# Patient Record
Sex: FEMALE | Race: WHITE | NOT HISPANIC OR LATINO | Employment: FULL TIME | URBAN - METROPOLITAN AREA
[De-identification: names, ages, dates, MRNs, and addresses within clinical notes are randomized per-mention and may not be internally consistent; named-entity substitution may affect disease eponyms.]

---

## 2023-04-21 ENCOUNTER — HOSPITAL ENCOUNTER (OUTPATIENT)
Dept: DATA CONVERSION | Facility: HOSPITAL | Age: 48
End: 2023-04-21
Attending: OBSTETRICS & GYNECOLOGY | Admitting: OBSTETRICS & GYNECOLOGY
Payer: COMMERCIAL

## 2023-04-21 DIAGNOSIS — R10.2 PELVIC AND PERINEAL PAIN: ICD-10-CM

## 2023-04-21 DIAGNOSIS — F41.9 ANXIETY DISORDER, UNSPECIFIED: ICD-10-CM

## 2023-04-21 DIAGNOSIS — N84.0 POLYP OF CORPUS UTERI: ICD-10-CM

## 2023-04-21 DIAGNOSIS — E03.9 HYPOTHYROIDISM, UNSPECIFIED: ICD-10-CM

## 2023-04-21 DIAGNOSIS — E78.5 HYPERLIPIDEMIA, UNSPECIFIED: ICD-10-CM

## 2023-04-21 DIAGNOSIS — G40.909 EPILEPSY, UNSPECIFIED, NOT INTRACTABLE, WITHOUT STATUS EPILEPTICUS (MULTI): ICD-10-CM

## 2023-04-21 DIAGNOSIS — N92.0 EXCESSIVE AND FREQUENT MENSTRUATION WITH REGULAR CYCLE: ICD-10-CM

## 2023-04-21 DIAGNOSIS — F32.A DEPRESSION, UNSPECIFIED: ICD-10-CM

## 2023-04-21 LAB
ERYTHROCYTE DISTRIBUTION WIDTH (RATIO) BY AUTOMATED COUNT: 13.4 % (ref 11.5–14.5)
ERYTHROCYTE MEAN CORPUSCULAR HEMOGLOBIN CONCENTRATION (G/DL) BY AUTOMATED: 33.8 G/DL (ref 32–36)
ERYTHROCYTE MEAN CORPUSCULAR VOLUME (FL) BY AUTOMATED COUNT: 92 FL (ref 80–100)
ERYTHROCYTES (10*6/UL) IN BLOOD BY AUTOMATED COUNT: 4.07 X10E12/L (ref 4–5.2)
HEMATOCRIT (%) IN BLOOD BY AUTOMATED COUNT: 37.3 % (ref 36–46)
HEMOGLOBIN (G/DL) IN BLOOD: 12.6 G/DL (ref 12–16)
LEUKOCYTES (10*3/UL) IN BLOOD BY AUTOMATED COUNT: 3.6 X10E9/L (ref 4.4–11.3)
PLATELETS (10*3/UL) IN BLOOD AUTOMATED COUNT: 123 X10E9/L (ref 150–450)

## 2023-04-28 LAB
COMPLETE PATHOLOGY REPORT: NORMAL
CONVERTED CLINICAL DIAGNOSIS-HISTORY: NORMAL
CONVERTED FINAL DIAGNOSIS: NORMAL
CONVERTED FINAL REPORT PDF LINK TO COPY AND PASTE: NORMAL
CONVERTED GROSS DESCRIPTION: NORMAL

## 2023-07-19 ENCOUNTER — OFFICE VISIT (OUTPATIENT)
Dept: PRIMARY CARE | Facility: CLINIC | Age: 48
End: 2023-07-19
Payer: COMMERCIAL

## 2023-07-19 ENCOUNTER — TELEPHONE (OUTPATIENT)
Dept: PRIMARY CARE | Facility: CLINIC | Age: 48
End: 2023-07-19

## 2023-07-19 VITALS
HEIGHT: 63 IN | SYSTOLIC BLOOD PRESSURE: 124 MMHG | DIASTOLIC BLOOD PRESSURE: 78 MMHG | HEART RATE: 105 BPM | WEIGHT: 183 LBS | BODY MASS INDEX: 32.43 KG/M2

## 2023-07-19 DIAGNOSIS — Z12.31 ENCOUNTER FOR SCREENING MAMMOGRAM FOR MALIGNANT NEOPLASM OF BREAST: ICD-10-CM

## 2023-07-19 DIAGNOSIS — R63.5 WEIGHT GAIN: ICD-10-CM

## 2023-07-19 DIAGNOSIS — E66.9 CLASS 1 OBESITY WITHOUT SERIOUS COMORBIDITY WITH BODY MASS INDEX (BMI) OF 32.0 TO 32.9 IN ADULT, UNSPECIFIED OBESITY TYPE: ICD-10-CM

## 2023-07-19 DIAGNOSIS — H60.8X2 CHRONIC ECZEMATOUS OTITIS EXTERNA OF LEFT EAR: ICD-10-CM

## 2023-07-19 DIAGNOSIS — Z00.00 ENCOUNTER FOR PREVENTIVE HEALTH EXAMINATION: Primary | ICD-10-CM

## 2023-07-19 PROBLEM — E78.5 HYPERLIPIDEMIA: Status: ACTIVE | Noted: 2023-07-19

## 2023-07-19 PROBLEM — E03.9 HYPOTHYROIDISM, ACQUIRED: Status: ACTIVE | Noted: 2018-05-21

## 2023-07-19 PROBLEM — H52.203 ASTIGMATISM, BILATERAL: Status: ACTIVE | Noted: 2018-05-21

## 2023-07-19 PROBLEM — H52.13 MYOPIA OF BOTH EYES: Status: ACTIVE | Noted: 2023-07-19

## 2023-07-19 PROBLEM — R79.89 ABNORMAL LIVER FUNCTION TEST: Status: ACTIVE | Noted: 2023-07-19

## 2023-07-19 PROCEDURE — 1036F TOBACCO NON-USER: CPT | Performed by: STUDENT IN AN ORGANIZED HEALTH CARE EDUCATION/TRAINING PROGRAM

## 2023-07-19 PROCEDURE — 99396 PREV VISIT EST AGE 40-64: CPT | Performed by: STUDENT IN AN ORGANIZED HEALTH CARE EDUCATION/TRAINING PROGRAM

## 2023-07-19 PROCEDURE — 3008F BODY MASS INDEX DOCD: CPT | Performed by: STUDENT IN AN ORGANIZED HEALTH CARE EDUCATION/TRAINING PROGRAM

## 2023-07-19 PROCEDURE — 99214 OFFICE O/P EST MOD 30 MIN: CPT | Performed by: STUDENT IN AN ORGANIZED HEALTH CARE EDUCATION/TRAINING PROGRAM

## 2023-07-19 RX ORDER — BUSPIRONE HYDROCHLORIDE 10 MG/1
10 TABLET ORAL 3 TIMES DAILY
COMMUNITY
Start: 2023-07-10

## 2023-07-19 RX ORDER — SEMAGLUTIDE 0.25 MG/.5ML
0.25 INJECTION, SOLUTION SUBCUTANEOUS
Qty: 2 ML | Refills: 0 | Status: SHIPPED | OUTPATIENT
Start: 2023-07-19 | End: 2024-02-05

## 2023-07-19 RX ORDER — HYDROXYZINE PAMOATE 50 MG/1
50 CAPSULE ORAL ONCE AS NEEDED
COMMUNITY
Start: 2021-10-11

## 2023-07-19 RX ORDER — ACETIC ACID 20.65 MG/ML
5 SOLUTION AURICULAR (OTIC) 3 TIMES DAILY
Qty: 15 ML | Refills: 0 | Status: SHIPPED | OUTPATIENT
Start: 2023-07-19 | End: 2023-07-26

## 2023-07-19 ASSESSMENT — PATIENT HEALTH QUESTIONNAIRE - PHQ9
SUM OF ALL RESPONSES TO PHQ9 QUESTIONS 1 AND 2: 0
2. FEELING DOWN, DEPRESSED OR HOPELESS: NOT AT ALL
1. LITTLE INTEREST OR PLEASURE IN DOING THINGS: NOT AT ALL

## 2023-07-19 NOTE — PROGRESS NOTES
"Subjective   Patient ID: Steffanie Santoro is a 48 y.o. female who presents for Earache.  HPI  She is a 48 years old female who presented today with ear problem.  Left ear feels itchy, muffle, no discharge no ear pain or fever.  She denies hearing loss.    She also is complaining about weight gain over the past year.  She is not feeling okay with that started crying in the office.  She did try diet changing(eating healthy), she is not exercising as much as she wants to.  She is concerned because the joints are started hurting especially the hip and the knees.    Denies new onset headaches, chills, n/v/d, chest pain, SOB, abdominal pain, urinary symptoms, and lower extremity edema.     Review of Systems  All other systems have been reviewed and are negative.    Visit Vitals  /78   Pulse 105   Ht 1.59 m (5' 2.6\")   Wt 83 kg (183 lb)   BMI 32.83 kg/m²   Smoking Status Never   BSA 1.91 m²       Objective   Physical Exam  General: Alert and oriented. Appears well-nourished and in no acute distress.  Eyes: PERRLA. EOMI.  Head/neck: Normocephalic. Supple.  Ear: No erythema on the external canal, some scale present, no pain during the exam.  Tympanic membrane not fully visualized due to wax, but normal color  Lymphatics: No cervical lymphadenopathy.  Respiratory/Thorax: Clear to auscultation bilaterally. No wheezing.   Cardiovascular: Regular rate and rhythm. No murmurs.  Gastrointestinal: Soft, nontender, nondistended. +BS   Musculoskeletal: ROM intact. No joint swelling. Normal strength   Extremities: Warm and well perfused. No peripheral edema.  Neurological: No gross neurologic deficits.   Psychological: Appropriate mood and affect.   Skin: No visible rashes or lesions.     Assessment/Plan   She is a 48 years old female who presented today to the office for physical exam.    #Ear eczema  -External canal was positive for some scale ,TM gray color  -Acetic acid 2%, apply to the left ear 3 times daily.  If that is is not " helpful we will try the same medication with corticosteroid topical.    #Weight gain  -Her labs were done in December, TSH was normal  -No A1c was done at the time but blood glucose was normal  -Patient is advised to follow a strict healthy diet, exercise more  -If insurance will cover we can start wegovy  0.25 mg subcutaneous for 4 weeks (watch for side effect) and increase dose after that to 0.5 mg    #Health maintenance  -Screening for colon cancer (she wanted to to order by her GI doctor)  -Ordered mammogram, screening for breast cancer  -She is up-to-date with Pap smear  -not due for labs      No red flags. Follow up in  1 month    Problem List Items Addressed This Visit    None  Visit Diagnoses       Encounter for preventive health examination    -  Primary    Encounter for screening mammogram for malignant neoplasm of breast        Relevant Orders    BI mammo bilateral screening tomosynthesis    Chronic eczematous otitis externa of left ear        Relevant Medications    acetic acid (Vosol) 2 % otic solution    Weight gain        Class 1 obesity without serious comorbidity with body mass index (BMI) of 32.0 to 32.9 in adult, unspecified obesity type        Relevant Medications    semaglutide, weight loss, (Wegovy) 0.25 mg/0.5 mL pen injector            I have personally reviewed all available pertinent labs, imaging, and consult notes with the patient.     All questions and concerns were addressed. Patient verbalizes understanding instructions and agrees with established plan of care.     I discussed the plan with Dr.Shahrimmnyan Juanita Carballo MD  Family medicine resident  PGY2

## 2023-07-19 NOTE — TELEPHONE ENCOUNTER
Great. I sent the Wegovy. She will need a new prescription for the 0.5mg weekly dose, so she needs to contact us when she is ready for that.

## 2023-07-20 NOTE — TELEPHONE ENCOUNTER
I would just recommend hydrocortisone over the counter then to the scaly part of the external ear canal. She can try a a drop of peroxide or alcohol to the ear to get it to dry up. It should dry out over the next 24 hours.

## 2023-07-20 NOTE — TELEPHONE ENCOUNTER
Wegovy on back order for a few weeks.   Also patient states that she did use the drops we prescribed last night and her ear has seemed to have gotten worse. She states it is now clogged and she cannot hear and it will not drain. She stopped using this.

## 2023-08-22 LAB
CHLAMYDIA TRACH., AMPLIFIED: NEGATIVE
CLUE CELLS: NORMAL
HSV 1 PCR QUAL, SKIN/MUCOSA: NOT DETECTED
HSV 2 PCR QUAL, SKIN/MUCOSA: NOT DETECTED
N. GONORRHEA, AMPLIFIED: NEGATIVE
NUGENT SCORE: 0
YEAST: NORMAL

## 2023-09-07 VITALS — BODY MASS INDEX: 34.08 KG/M2 | HEIGHT: 62 IN | WEIGHT: 185.19 LBS

## 2023-09-14 NOTE — H&P
History & Physical Reviewed:   Pregnant/Lactating:  ·  Are You Pregnant no   ·  Are You Currently Breastfeeding no     I have reviewed the History and Physical dated:  22-Mar-2023   History and Physical reviewed and relevant findings noted. Patient examined to review pertinent physical  findings.: No significant changes   Home Medications Reviewed: no changes noted   Allergies Reviewed: no changes noted       ERAS (Enhanced Recovery After Surgery):  ·  ERAS Patient: no     Consent:   COVID-19 Consent:  ·  COVID-19 Risk Consent Surgeon has reviewed key risks related to the risk of garrick COVID-19 and if they contract COVID-19 what the risks are.       Electronic Signatures:  Temi Henderson)  (Signed 21-Apr-2023 11:45)   Authored: History & Physical Reviewed, ERAS, Consent,  Note Completion      Last Updated: 21-Apr-2023 11:45 by Temi Henderson)

## 2023-10-02 NOTE — OP NOTE
PROCEDURE DETAILS    Preoperative Diagnosis:  menorrhagia    Postoperative Diagnosis:  menorrhagia    Surgeon: Temi Henderson  Resident/Fellow/Other Assistant: None of these were associated with this case    Procedure:  1.  Hysteroscopy dilation and curettage, ANABELLE ablation    Anesthesia: No anesthesiologist associated with this case  Estimated Blood Loss: 2  Findings: Normal appearing endometrial cavity.  Specimens(s) Collected: yes,  endometrial curettings.    Complications: none  Urine Output: 30        Operative Report:   The patient was brought to the operating room where compression stockings were applied and adequate anesthesia was obtained. She was placed in lithotomy position  and iodine vaginal and perineal prep was performed. A weighted speculum was placed in the vagina. The anterior lip of the cervix was grasped with a tenaculum.  The cervix was progressively dilated using cervical dilators and cervical length wa measured.  The uterus was sounded to 8 cm. The hysteroscope was then inserted and intrauterine placement confirmed. The cervical canal, endometrial cavity and both tubal ossea were visualized. Normal appearing endometrium was noted.   The hysteroscope was then removed. Sharp curettage was then performed with endometrial tissue obtained and sent to pathology.   The Anabelle device was then inserted and opened to the previously measure endometrial cavity length of 5 cm. The width of the device was within guidelines. Cavity assessment was passed. Endometrial ablation was then performed for 2 minutes duration.   The hysteroscope was reinserted and ablated endometrial cavity confirmed. The hysteroscope was removed.  Tenaculum was removed from the cervix. All instruments were removed from the vagina. The patient was taken in stable condition to recovery.                        Attestation:   Note Completion:  Attending Attestation I performed the procedure without a resident          Electronic Signatures:  Temi Henderson)  (Signed 21-Apr-2023 12:49)   Authored: Post-Operative Note, Chart Review, Note Completion      Last Updated: 21-Apr-2023 12:49 by Temi Henderson)

## 2023-12-05 ENCOUNTER — OFFICE VISIT (OUTPATIENT)
Dept: PRIMARY CARE | Facility: CLINIC | Age: 48
End: 2023-12-05
Payer: COMMERCIAL

## 2023-12-05 VITALS
DIASTOLIC BLOOD PRESSURE: 80 MMHG | BODY MASS INDEX: 33.49 KG/M2 | HEIGHT: 63 IN | SYSTOLIC BLOOD PRESSURE: 140 MMHG | HEART RATE: 100 BPM | WEIGHT: 189 LBS

## 2023-12-05 DIAGNOSIS — R51.9 CHRONIC NONINTRACTABLE HEADACHE, UNSPECIFIED HEADACHE TYPE: Primary | ICD-10-CM

## 2023-12-05 DIAGNOSIS — G89.29 CHRONIC NONINTRACTABLE HEADACHE, UNSPECIFIED HEADACHE TYPE: Primary | ICD-10-CM

## 2023-12-05 DIAGNOSIS — H53.8 BLURRY VISION, LEFT EYE: ICD-10-CM

## 2023-12-05 PROCEDURE — 3008F BODY MASS INDEX DOCD: CPT | Performed by: STUDENT IN AN ORGANIZED HEALTH CARE EDUCATION/TRAINING PROGRAM

## 2023-12-05 PROCEDURE — 1036F TOBACCO NON-USER: CPT | Performed by: STUDENT IN AN ORGANIZED HEALTH CARE EDUCATION/TRAINING PROGRAM

## 2023-12-05 PROCEDURE — 99214 OFFICE O/P EST MOD 30 MIN: CPT | Performed by: STUDENT IN AN ORGANIZED HEALTH CARE EDUCATION/TRAINING PROGRAM

## 2023-12-05 ASSESSMENT — PATIENT HEALTH QUESTIONNAIRE - PHQ9
2. FEELING DOWN, DEPRESSED OR HOPELESS: NOT AT ALL
SUM OF ALL RESPONSES TO PHQ9 QUESTIONS 1 AND 2: 0
1. LITTLE INTEREST OR PLEASURE IN DOING THINGS: NOT AT ALL

## 2023-12-05 NOTE — PROGRESS NOTES
Subjective   Patient ID: Steffanie Santoro is a 48 y.o. female who presents for Headache.         Reviewed all medications by prescribing practitioner or clinical pharmacist (such as prescriptions, OTCs, herbal therapies and supplements) and documented in the medical record.    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    All other systems have been reviewed and are negative and noncontributory to this patient's current ailments.    Past Medical History:   Diagnosis Date    Personal history of other mental and behavioral disorders     History of anxiety     Past Surgical History:   Procedure Laterality Date    FOOT SURGERY  01/22/2015    Foot Surgery    OTHER SURGICAL HISTORY  01/22/2015    Gastrointestinal Surgery    OTHER SURGICAL HISTORY  01/22/2015    Uterine Fibroid Embolization    OTHER SURGICAL HISTORY  01/22/2015    Ovarian Cystectomy     Social History     Tobacco Use    Smoking status: Never    Smokeless tobacco: Never  "    No family history on file.    There is no immunization history on file for this patient.  Current Outpatient Medications   Medication Instructions    busPIRone (BUSPAR) 10 mg, 3 times daily    hydrOXYzine pamoate (VISTARIL) 50 mg, oral, Once as needed    Wegovy 0.25 mg, subcutaneous, Weekly     Allergies   Allergen Reactions    Tetracyclines Swelling and Other     Reaction Date:Adolescence       Objective   Vitals:    12/05/23 1543   BP: 140/80   Pulse: 100   Weight: 85.7 kg (189 lb)   Height: 1.6 m (5' 3\")     Body mass index is 33.48 kg/m².    BP Readings from Last 3 Encounters:   12/05/23 140/80   07/19/23 124/78   05/09/23 120/80      Wt Readings from Last 3 Encounters:   12/05/23 85.7 kg (189 lb)   07/19/23 83 kg (183 lb)   05/09/23 84.4 kg (186 lb)        No visits with results within 1 Month(s) from this visit.   Latest known visit with results is:   Orders Only on 08/22/2023   Component Date Value    Neisseria gonorrhea,Ampl* 08/21/2023 NEGATIVE     Chlamydia trachomatis, A* 08/21/2023 NEGATIVE     Tahir Score 08/21/2023 0     Yeast 08/21/2023 ABSENT     Clue Cells 08/21/2023 ABSENT     Herpes simplex virus 1 P* 08/21/2023 NOT DETECTED     Herpes simplex virus 2 P* 08/21/2023 NOT DETECTED      Physical Exam  CONSTITUTIONAL - well nourished, well developed, looks like stated age, in no acute distress, not ill-appearing, and not tired appearing  SKIN - normal skin color and pigmentation, normal skin turgor without rash, lesions, or nodules visualized  HEAD - no trauma, normocephalic  CHEST - clear to auscultation, no wheezing, no crackles and no rales, good effort  CARDIAC - regular rate and regular rhythm, no skipped beats, no murmur  EXTREMITIES - no edema, no deformities  NEUROLOGICAL - normal gait, normal balance, normal motor  PSYCHIATRIC - alert, pleasant and cordial, age-appropriate     Assessment/Plan   Problem List Items Addressed This Visit       Chronic nonintractable headache - Primary     As " we discussed, we will order a brain MRI to make sure there are no masses that may be responsible for your symptoms  We will follow up with results  You can try stopping the mushroom coffee to see if your symptoms resolve         Relevant Orders    MR brain wo IV contrast    Blurry vision, left eye    Relevant Orders    MR brain wo IV contrast

## 2023-12-05 NOTE — ASSESSMENT & PLAN NOTE
As we discussed, we will order a brain MRI to make sure there are no masses that may be responsible for your symptoms  We will follow up with results  You can try stopping the mushroom coffee to see if your symptoms resolve

## 2023-12-12 ENCOUNTER — PATIENT MESSAGE (OUTPATIENT)
Dept: PRIMARY CARE | Facility: CLINIC | Age: 48
End: 2023-12-12
Payer: COMMERCIAL

## 2023-12-12 DIAGNOSIS — E66.9 CLASS 1 OBESITY WITHOUT SERIOUS COMORBIDITY WITH BODY MASS INDEX (BMI) OF 32.0 TO 32.9 IN ADULT, UNSPECIFIED OBESITY TYPE: Primary | ICD-10-CM

## 2024-02-03 DIAGNOSIS — E66.9 CLASS 1 OBESITY WITHOUT SERIOUS COMORBIDITY WITH BODY MASS INDEX (BMI) OF 32.0 TO 32.9 IN ADULT, UNSPECIFIED OBESITY TYPE: ICD-10-CM

## 2024-02-05 DIAGNOSIS — E66.9 CLASS 1 OBESITY WITHOUT SERIOUS COMORBIDITY WITH BODY MASS INDEX (BMI) OF 32.0 TO 32.9 IN ADULT, UNSPECIFIED OBESITY TYPE: ICD-10-CM

## 2024-02-05 RX ORDER — TIRZEPATIDE 2.5 MG/.5ML
2.5 INJECTION, SOLUTION SUBCUTANEOUS
Qty: 2 ML | Refills: 0 | Status: SHIPPED | OUTPATIENT
Start: 2024-02-05

## 2024-02-05 RX ORDER — TIRZEPATIDE 2.5 MG/.5ML
2.5 INJECTION, SOLUTION SUBCUTANEOUS
Qty: 2 ML | Refills: 0 | Status: SHIPPED | OUTPATIENT
Start: 2024-02-05 | End: 2024-02-05 | Stop reason: SDUPTHER

## 2024-04-29 ENCOUNTER — TELEPHONE (OUTPATIENT)
Dept: PRIMARY CARE | Facility: CLINIC | Age: 49
End: 2024-04-29
Payer: COMMERCIAL

## 2024-04-29 DIAGNOSIS — R11.0 NAUSEA: Primary | ICD-10-CM

## 2024-04-29 RX ORDER — ONDANSETRON 4 MG/1
4 TABLET, ORALLY DISINTEGRATING ORAL EVERY 8 HOURS PRN
Qty: 30 TABLET | Refills: 0 | Status: SHIPPED | OUTPATIENT
Start: 2024-04-29

## 2024-05-13 ENCOUNTER — TELEPHONE (OUTPATIENT)
Dept: OTOLARYNGOLOGY | Facility: CLINIC | Age: 49
End: 2024-05-13

## 2024-05-13 ENCOUNTER — OFFICE VISIT (OUTPATIENT)
Dept: OTOLARYNGOLOGY | Facility: CLINIC | Age: 49
End: 2024-05-13
Payer: COMMERCIAL

## 2024-05-13 VITALS — BODY MASS INDEX: 33.19 KG/M2 | WEIGHT: 187.3 LBS | HEIGHT: 63 IN | TEMPERATURE: 97.5 F

## 2024-05-13 DIAGNOSIS — R09.81 NASAL CONGESTION: ICD-10-CM

## 2024-05-13 DIAGNOSIS — J34.89 NASAL AND SINUS DISCHARGE: ICD-10-CM

## 2024-05-13 DIAGNOSIS — J30.9 ALLERGIC RHINITIS, UNSPECIFIED SEASONALITY, UNSPECIFIED TRIGGER: ICD-10-CM

## 2024-05-13 DIAGNOSIS — R44.8 FACIAL PRESSURE: ICD-10-CM

## 2024-05-13 DIAGNOSIS — J34.2 DEVIATED NASAL SEPTUM: Primary | ICD-10-CM

## 2024-05-13 PROCEDURE — 99204 OFFICE O/P NEW MOD 45 MIN: CPT | Performed by: OTOLARYNGOLOGY

## 2024-05-13 PROCEDURE — 31231 NASAL ENDOSCOPY DX: CPT | Performed by: OTOLARYNGOLOGY

## 2024-05-13 PROCEDURE — 1036F TOBACCO NON-USER: CPT | Performed by: OTOLARYNGOLOGY

## 2024-05-13 PROCEDURE — 3008F BODY MASS INDEX DOCD: CPT | Performed by: OTOLARYNGOLOGY

## 2024-05-13 RX ORDER — AZELASTINE 1 MG/ML
2 SPRAY, METERED NASAL 2 TIMES DAILY
Qty: 30 ML | Refills: 3 | Status: SHIPPED | OUTPATIENT
Start: 2024-05-13 | End: 2024-06-07

## 2024-05-13 RX ORDER — FLUTICASONE PROPIONATE 93 UG/1
SPRAY, METERED NASAL
Qty: 16 ML | Refills: 11 | Status: SHIPPED | OUTPATIENT
Start: 2024-05-13

## 2024-05-13 NOTE — PROGRESS NOTES
Chief Complaint:  Nasal congestion    History Of Present Illness:  Reason For Visit:     April presents as a new patient to me.  She has had sinonasal symptoms for a number of years that seem to be worsening.  Her most pressing concern was left worse than right nasal airway obstruction.  She also mentioned feeling cracking and popping sensation in her left cheek over the last several weeks particularly when she lays down at night.  This does not occur nightly but certainly several times per week.    She has had sinonasal symptoms in the distant past and was told that she had some kind of lesion within the left maxillary sinus.    She is currently taking fluticasone 1 spray each nostril once per day as prescribed through her primary care provider.  She has been using this for at least the last 6 months.    Main Symptoms:  Patient does not have anterior nasal drainage.     Patient does not have  posterior nasal drainage.    Patient has L>R nasal airway obstruction.   Patient does not have facial pain.  Patient has  left-sided facial pressure.   Patient does not have decreased sense of smell.    Associated Symptoms:   Patient has  headaches.    Patient does not have throat clearing.    Patient does not have coughing.    Patient does not have dysphonia.   Patient does not have nasal bleeding.     Medications currently on for sinonasal symptoms: Flonase 1 puff each side at night (used consistently for 6 months)   Medications tried in the past for sinonasal symptoms:  Sudafed    Other Pertinent Medical Conditions:   Patient does not have asthma.    Patient does not have aspirin sensitivity.    Patient has migraines. Patient does not visit a neurologist.   Patient has history of allergy testing (more than 1x). When: 6-7 years ago (+cats, dogs, dust mites, and cashew) Patient does not have history of IT.   Patient does not have history of sinus surgery.    Patient does not have history of nasal fracture.    Patient does not  have heartburn.    The patient does not take medical therapy for heartburn.   The patient has imaging of sinuses. When: MRI 2011, and she has a more recent MRI brain wo IV contrast 12/15/23 for headaches.     Active Problems:  Patient Active Problem List   Diagnosis    Abnormal liver function test    Astigmatism, bilateral    Generalized anxiety disorder with panic attacks    Hyperlipidemia    Hypothyroidism, acquired    IBS (irritable bowel syndrome)    Myopia of both eyes    PMDD (premenstrual dysphoric disorder)    Chronic nonintractable headache    Blurry vision, left eye      Past Medical History:  She has a past medical history of Personal history of other mental and behavioral disorders.    Surgical History:  She has a past surgical history that includes Foot surgery (01/22/2015); Other surgical history (01/22/2015); Other surgical history (01/22/2015); and Other surgical history (01/22/2015).     Family History:  No family history on file.    Social History:  She reports that she has never smoked. She has never used smokeless tobacco. No history on file for alcohol use and drug use.     Allergies:  Tetracyclines    Current Meds:    Current Outpatient Medications:     busPIRone (Buspar) 10 mg tablet, 1 tablet (10 mg) 3 times a day., Disp: , Rfl:     hydrOXYzine pamoate (Vistaril) 50 mg capsule, Take 1 capsule (50 mg) by mouth 1 time if needed., Disp: , Rfl:     ondansetron ODT (Zofran-ODT) 4 mg disintegrating tablet, Take 1 tablet (4 mg) by mouth every 8 hours if needed for nausea or vomiting., Disp: 30 tablet, Rfl: 0    tirzepatide, weight loss, (Zepbound) 2.5 mg/0.5 mL injection, Inject 2.5 mg under the skin 1 (one) time per week., Disp: 2 mL, Rfl: 0    Vitals:  Visit Vitals  Smoking Status Never        Physical Exam:  CONSTITUTIONAL: Vitals reviewed in nursing chart, well developed, well nourished.   RESPIRATION: Breathing comfortably, no stridor.  CV: No clubbing/cyanosis/edema in hands.  EYES: EOM  Intact, sclera normal.  NEURO: Alert and oriented times 3, Cranial nerves 2-12 intact and symmetric bilaterally.  HEAD AND FACE: Skin with no masses or lesions, sinuses nontender to palpation.  SALIVARY GLANDS: Parotid and submandibular glands normal bilaterally.  EARS: Normal external ears, external auditory canals, and TMs to otoscopy, normal hearing to whispered voice.  NOSE: External nose midline, anterior rhinoscopy is normal with limited visualization to the anterior aspect of the interior turbinates (see nasal endoscopy).  ORAL CAVITY/OROPHARYNX/LIPS: Normal mucous membranes, normal floor of mouth/tongue/OP, no masses or lesions are noted.  NECK/LYMPH: No LAD, no thyroid masses.    SINONASAL ENDOSCOPY (CPT 42353): To better evaluate the patient's symptoms, sinonasal endoscopy is indicated.  After discussion of risks and benefits, and topical decongestion and anesthesia, an endoscope was used to perform nasal endoscopy on each side.  A time out identifying the patient, the procedure, the location of the procedure and any concerns was performed prior to beginning the procedure.    Findings:  Examination of the right nasal cavity revealed no evidence of purulence or polyposis at the middle meatus or sphenoethmoid recess.  Examination of the left nasal cavity revealed no evidence of purulence or polyposis at the middle meatus or sphenoethmoid recess.  She has a septal deviation to the left.  Bilateral inferior turbinate hypertrophy.  Nasopharynx was normal.    Results/Data:  I personally reviewed the MRI report dated 12/15/23 today in clinic, however, I was not able to access the images. It demonstrated:   FINDINGS:  Acute Change: No acute infarct or intracranial hemorrhage. No mass effect or herniation.  Chronic Change: The white matter is within normal limits of signal intensity for age.  Ventricles and Sulci: Normal caliber.  Skull Base: Hypothalamic and pituitary region are grossly normal. Craniocervical  junction is normal. No marrow replacement process.  Vasculature: Major intracranial vessels have normal flow voids suggesting patency.  Extracranial Structures: The paranasal sinuses and mastoid air cells are clear. The orbits and extracranial soft tissues are unremarkable.     IMPRESSION:   Normal MRI of the brain     I reviewed the report of a different MRI from September 13, 2011.  The report mentioned a mucous retention cyst in the left maxillary sinus of 1.5 cm.    I reviewed a CT neck from April 11, 2013.  Again there was a lesion within the left maxillary sinus consistent with a mucous retention cyst or polyp.    Provider Impressions:  1.  Nasal airway obstruction, deviated nasal septum, inferior turbinate hypertrophy  2.  Facial pressure, headaches, migraines  3.  Allergic rhinitis  4.  Left maxillary sinus lesion    Discussion:  Steffanie Santoro and I discussed her symptoms and history.  I cannot completely explain the popping and crackling she is feeling in her left maxillary sinus at night but I will ask my nurse to request her most recent MRI obtained through the [facility] and we can review this in regard to her sinuses at her next assessment with me.  The official report is that this was a normal MRI of her brain and the visualized sinuses were clear but I would like to specifically look at her left maxillary sinus as something subtle could have been missed.    I reassured her that her nasal endoscopy today did not demonstrate any concerning findings.  We discussed additional options and she was comfortable discontinuing fluticasone and initiating Xhance.  I suggested 1 puff each side twice per day or 2 puffs each side once per day.  This was prescribed today and a sample was also given.  My nurse and the patient discussed appropriate administration technique.    If her symptoms persist despite Xhance I think she would do well considering a rinse based steroid or potentially a topical antihistamine  given her allergic background.  She certainly would also have surgical options in regard to her nasal airway and potentially her left maxillary sinus.    I would like to see her back virtually in about 6 weeks to review the results of her MRI obtained through the [facility] last year as well as to assess her benefit with the new topical spray.  All questions were answered.    Patient Discussion/Summary:  Welcome to Dr. Chilo Reilly's clinic. We are here to assist you with your ENT needs at [facility] [place]. Dr. Reilly is an ENT that specializes in nose, sinus, and skull base disorders.    Dr. Chilo Reilly's office number is [phone]. Please call this number to contact his care team regardless of which office you use to access care. This number is the most direct way to communicate with all the members of the care team.    Rebekah Bales CNP is a nurse practitioner who is a part of Dr. Reilly's team. She will work collaboratively with Dr. Reilly to meet your goals. This often may include seeing you for more urgent appointments or follow-up visits under Dr. Reilly's supervision.    Tatiana Sen RN BSN is Dr. Reilly's primary nurse. She can be reached by calling [phone]. Tatiana is available during business hours Tuesday through Friday. Rebekah Haynes RN BSN is her rhinology nurse partner. Rebekah is available during business hours Monday through Thursday. Messages left for them will be returned within one business day. Tatiana is also Dr. Reilly's surgery scheduler and will assist you with planning and scheduling of your surgery during her office hours.     Georgina Catalan is Dr. Reilly's  and you can reach her at [phone]. She can help you with scheduling of appointments, general questions and information. She is available to receive calls Monday through Friday from 8:00 am until 4:25 pm.     For your convenience, Dr. Reilly sees patients  at [facility] and [facility] at [facility]. While we try to make your appointments as convenient as possible, occasionally a visit to another location may be necessary to provide the best care for you.    Dr. Reilly makes every effort to run on time for your appointments. Therefore, if you are more than 25 minutes late, your appointment will need to be rescheduled to another day. We appreciate your understanding.     We look forward to working with you to meet your healthcare goals.     Signature:  Scribe Attestation  By signing my name below, ICarley Scribe   attest that this documentation has been prepared under the direction and in the presence of Chilo Reilly MD.

## 2024-05-13 NOTE — TELEPHONE ENCOUNTER
Patient called after her appointment and stated her insurance wont cover the Xhance and it is $100 and was wondering if there was another medication option. Discussed with Dr. Reilly who placed a verbal order for Azelastine nasal spray. Patient educated on proper technique, order placed and sent to pharmacy.

## 2024-05-29 ENCOUNTER — CLINICAL SUPPORT (OUTPATIENT)
Dept: AUDIOLOGY | Facility: CLINIC | Age: 49
End: 2024-05-29
Payer: COMMERCIAL

## 2024-05-29 ENCOUNTER — OFFICE VISIT (OUTPATIENT)
Dept: OTOLARYNGOLOGY | Facility: CLINIC | Age: 49
End: 2024-05-29
Payer: COMMERCIAL

## 2024-05-29 VITALS — WEIGHT: 181 LBS | BODY MASS INDEX: 32.07 KG/M2 | HEIGHT: 63 IN

## 2024-05-29 DIAGNOSIS — M26.609 TMJ DYSFUNCTION: ICD-10-CM

## 2024-05-29 DIAGNOSIS — H92.03 EAR PAIN, BILATERAL: Primary | ICD-10-CM

## 2024-05-29 DIAGNOSIS — L29.9 EAR ITCHING: ICD-10-CM

## 2024-05-29 DIAGNOSIS — H93.8X3 SENSATION OF FULLNESS IN BOTH EARS: ICD-10-CM

## 2024-05-29 DIAGNOSIS — H60.8X2 CHRONIC ECZEMATOUS OTITIS EXTERNA OF LEFT EAR: ICD-10-CM

## 2024-05-29 DIAGNOSIS — H61.23 BILATERAL IMPACTED CERUMEN: Primary | ICD-10-CM

## 2024-05-29 PROCEDURE — 3008F BODY MASS INDEX DOCD: CPT

## 2024-05-29 PROCEDURE — 1036F TOBACCO NON-USER: CPT | Performed by: NURSE PRACTITIONER

## 2024-05-29 PROCEDURE — 99214 OFFICE O/P EST MOD 30 MIN: CPT | Performed by: NURSE PRACTITIONER

## 2024-05-29 PROCEDURE — 92550 TYMPANOMETRY & REFLEX THRESH: CPT

## 2024-05-29 PROCEDURE — 3008F BODY MASS INDEX DOCD: CPT | Performed by: NURSE PRACTITIONER

## 2024-05-29 PROCEDURE — 92557 COMPREHENSIVE HEARING TEST: CPT

## 2024-05-29 RX ORDER — FLUOCINOLONE ACETONIDE 0.11 MG/ML
OIL AURICULAR (OTIC)
Qty: 20 ML | Refills: 1 | Status: SHIPPED | OUTPATIENT
Start: 2024-05-29

## 2024-05-29 NOTE — PROGRESS NOTES
ADULT AUDIOLOGY AUDIOMETRIC EVALUATION    Name:  Steffanie Santoro  :  1975  Age:  49 y.o.  Date of Evaluation:  2024    HISTORY  Steffanie Santoro is seen today at the request of Florencia Wilkerson CNP for an evaluation of hearing.  Patient arrives with the complaint of recent ear infections treated by urgent care with oral antibiotics. Patient consifrmed ear pain and muffled hearing. Denies ear surgeries, tinnitus, noise exposure, family history of hearing loss and dizziness/vertigo.      AUDIOLOGIC EVALUATION    OTOSCOPY  Otoscopic inspection revealed clear canals and visualization of the eardrum bilaterally.    IMMITTANCE  Normal tympanograms were obtained bilaterally, consistent with a normal moving eardrums and the likely absence of fluid.    Ipsilateral acoustic reflexes were tested and present  at 500Hz, 1000Hz, 2000Hz, and 4000Hz bilaterally.    AUDIOMETRIC TESTING  Pure tone audiometry conducted via insert headphones from 125 Hz - 8000 Hz with good reliability was consistent with normal hearing bilaterally     SPEECH RECOGNITION TESTING (SRT)  SRT was in agreement with pure tone averages bilaterally ( Right: 5 dB HL, Left: 5 dB HL).    WORD RECOGNITION SCORE (WRS)  WRS was (100%) in the right ear and (100%) in the left ear using recorded ordered by difficulty NU6 word list.    IMPRESSIONS:  The results of today's assessment after consistent with:  -Normal tympanograms  -Present acoustic reflexes   -Normal hearing bilaterally.     The patient was counseled with regard to the findings.    RECOMMENDATIONS:  Treatment Plan:.  Follow up with ENT/PCP as recommended.  Annual hearing assessments as recommended. Follow up sooner if patient feels hearing or symptoms have changed.  Contact the clinic with questions or concerns at [phone].       Andrei Fair, CCC-A, [facility]  Licensed Audiologist  Certified Occupational Hearing Conservationist

## 2024-05-29 NOTE — PROGRESS NOTES
Subjective   Patient ID: Steffanie Santoro is a 49 y.o. female who presents for New Patient Visit (Swelling in ears left ear feels like she is swimming under water.).  HPI  The patient is referred for evaluation of a bilateral ear infection.  When asked about ear pain, hearing loss, discharge from ear, tinnitus, aural fullness or autophony in the affected ear or ears, the patient admits to right than left aural fullness and otalgia.  Patient was evaluated in urgent care and told they could not see her eardrum due to swelling.  She was treated with oral antibiotics and eardrops.  Since then, pain has resolved but she continues to endorse fullness and hearing difficulty.  When asked about a significant past otological history including history of prior ear surgery, noise exposure, exposure to ototoxic drugs or agents, and/or family history of hearing loss, the patient admits to recreational noise exposure.    Review of Systems  A comprehensive or 10 points review of the patient´s constitutional, neurological, HEENT, pulmonary, cardiovascular and genito-urinary systems showed only those mentioned in history of present illness.    Objective   Physical Exam  Constitutional: no fever, chills, weight loss or weight gain   General appearance: Appears well, well-nourished, well groomed. No acute distress.   Communication: Normal communication   Psychiatric: Oriented to person, place and time. Normal mood and affect.   Neurologic: Cranial nerves II-XII grossly intact and symmetric bilaterally.   Head and Face:   Head: Atraumatic with no masses, lesions or scarring.   Face: Normal symmetry, no paralysis, synkinesis or facial tic. No scars or deformities.   TMJ's: Bilateral tenderness and crepitus  Eyes: Conjunctiva not edematous or erythematous   Ears: External inspection of ears with no deformity, scars or masses.  Canals essentially clear.  There is a thin film of clear/yellow debris coating both TMs.  Using the microscope and  #3 suction, attempted to remove but was unsuccessful.  No effusions or retractions noted.     Neck: Normal appearing, symmetric, trachea midline.   Cardiovascular: Examination of peripheral vascular system shows no clubbing or cyanosis.   Respiratory: No respiratory distress increased work of breathing. Inspection of the chest with symmetric chest expansion and normal respiratory effort.   Skin: No rashes in the head or neck  My interpretation of the audiogram done today is normal hearing with excellent word recognition scores and normal tympanograms bilaterally.  Assessment/Plan        This patient presents for initial evaluation of acute acquired bilateral cerumen impaction, bilateral aural fullness as well as chronic left eczematous otitis externa, left EAC itching, bilateral TMJ dysfunction.    Reassurance given that otologic exam today is essentially normal.  We discussed that the thin film was covering her TMs could be causing her sensation of fullness, but chronic TMJ dysfunction could also cause this symptom.  I recommended trying comfort measures for TMJ dysfunction.  If fullness has not improved, she will use oil-based drops to both ear canals x 7 days then see me for a repeat cleaning.  We also discussed that her description of severe EAC itching is consistent with chronic eczema.  I recommended steroid drops as needed.  Patient is in agreement with the plan.  All questions were answered to patient's satisfaction    This note was created using speech recognition transcription software. Despite proofreading, several typographical errors might be present that might affect the meaning of the content. Please call with any questions.      LOUIS Lerner-CNP 05/29/24 4:19 PM

## 2024-06-05 DIAGNOSIS — R09.81 NASAL CONGESTION: ICD-10-CM

## 2024-06-07 RX ORDER — AZELASTINE 1 MG/ML
SPRAY, METERED NASAL
Qty: 90 ML | Refills: 3 | Status: SHIPPED | OUTPATIENT
Start: 2024-06-07

## 2024-06-20 ENCOUNTER — APPOINTMENT (OUTPATIENT)
Dept: OBSTETRICS AND GYNECOLOGY | Facility: CLINIC | Age: 49
End: 2024-06-20
Payer: COMMERCIAL

## 2024-06-24 ENCOUNTER — APPOINTMENT (OUTPATIENT)
Dept: OTOLARYNGOLOGY | Facility: CLINIC | Age: 49
End: 2024-06-24
Payer: COMMERCIAL

## 2024-06-24 DIAGNOSIS — R09.81 NASAL CONGESTION: Primary | ICD-10-CM

## 2024-06-24 DIAGNOSIS — R44.8 FACIAL PRESSURE: ICD-10-CM

## 2024-06-24 PROBLEM — Z01.419 WELL WOMAN EXAM WITH ROUTINE GYNECOLOGICAL EXAM: Status: ACTIVE | Noted: 2024-06-24

## 2024-06-24 PROCEDURE — 1036F TOBACCO NON-USER: CPT | Performed by: OTOLARYNGOLOGY

## 2024-06-24 PROCEDURE — 3008F BODY MASS INDEX DOCD: CPT | Performed by: OTOLARYNGOLOGY

## 2024-06-24 PROCEDURE — 99214 OFFICE O/P EST MOD 30 MIN: CPT | Performed by: OTOLARYNGOLOGY

## 2024-06-24 NOTE — ASSESSMENT & PLAN NOTE
Pap is not yet indicated.   Mammogram is ordered.  Regular exercise and attaining/maintaining a healthy weight is encouraged.   Adequate calcium intake with diet or supplements is encouraged.    We will notify of any abnormal results.

## 2024-06-24 NOTE — PROGRESS NOTES
Subjective   Patient ID: April GENI Santoro is a 49 y.o. female who presents for Urinary Frequency (Protruding bulge in vaginal area ).  Last annual exam was on 1/11/2023. Pap and HPV were negative 12/20/2021. Ultrasound 1/21/2022 measured the uterus at 9.5 x 5.4 x 6.2 cm with 14 mm endometrium, no fibroids and no adnexal abnormality. She is s/p endometrial ablation on 4/21/2023 and endometrial pathology returned benign that date. She has minimal spotting for menses now. She can also note some yellow discharge without s/s of infection.     She suspects pelvic prolapse. Over the past year during sex she can have small amount of urine leaking. She also has urinary frequency with needing to void 2-3 times at night and every 2-3 hours during the day. There is also a bulge noted in the vagina. She can have small amounts of urinary leaking with cough.           Review of Systems   Constitutional:  Negative for activity change.   HENT:  Negative for congestion.    Respiratory:  Negative for apnea and cough.    Cardiovascular:  Negative for chest pain.   Gastrointestinal:  Negative for constipation and diarrhea.   Genitourinary:  Negative for hematuria and vaginal pain.   Musculoskeletal:  Negative for joint swelling.   Neurological:  Negative for dizziness.   Psychiatric/Behavioral:  Negative for agitation.        Past Medical History:   Diagnosis Date    Personal history of other mental and behavioral disorders     History of anxiety      Past Surgical History:   Procedure Laterality Date    FOOT SURGERY  01/22/2015    Foot Surgery    OTHER SURGICAL HISTORY  01/22/2015    Gastrointestinal Surgery    OTHER SURGICAL HISTORY  01/22/2015    Uterine Fibroid Embolization    OTHER SURGICAL HISTORY  01/22/2015    Ovarian Cystectomy      Allergies   Allergen Reactions    Tetracyclines Swelling and Other     Reaction Date:Adolescence    Cat Dander Agitation    Dog Dander Agitation    House Dust Agitation    Animal Dander Rash       Current Outpatient Medications on File Prior to Visit   Medication Sig Dispense Refill    hydrOXYzine pamoate (Vistaril) 50 mg capsule Take 1 capsule (50 mg) by mouth 1 time if needed.      tirzepatide, weight loss, (Zepbound) 2.5 mg/0.5 mL injection Inject 2.5 mg under the skin 1 (one) time per week. 2 mL 0    azelastine (Astelin) 137 mcg (0.1 %) nasal spray USE 2 SPRAYS IN EACH NOSTRIL TWICE DAILY AS NEEDED FOR NASAL DRAINAGE. 90 mL 3    busPIRone (Buspar) 10 mg tablet 1 tablet (10 mg) 3 times a day.      fluocinolone (DermOtic) 0.01 % ear drops Instill 2 to 3 drops to each ear canal twice daily x 3 days as needed for itching/flaking skin. 20 mL 1    fluticasone propionate (Xhance) 93 mcg/actuation aerosol breath activated Spray 1 spray in each nostril twice daily. 16 mL 11    mometasone furoate, bulk, 100 % powder Compound 1 mg capsule to be added to sinus rinse twice daily. 60 g 0    ondansetron ODT (Zofran-ODT) 4 mg disintegrating tablet Take 1 tablet (4 mg) by mouth every 8 hours if needed for nausea or vomiting. (Patient not taking: Reported on 5/29/2024) 30 tablet 0     No current facility-administered medications on file prior to visit.        Objective   Physical Exam  Constitutional:       Appearance: Normal appearance.   Neck:      Thyroid: No thyromegaly.   Cardiovascular:      Rate and Rhythm: Normal rate and regular rhythm.      Heart sounds: Normal heart sounds.   Pulmonary:      Effort: Pulmonary effort is normal.      Breath sounds: Normal breath sounds.   Chest:      Chest wall: No mass.   Breasts:     Right: Normal. No inverted nipple, mass, nipple discharge or skin change.      Left: Normal. No inverted nipple, mass, nipple discharge or skin change.   Abdominal:      General: There is no distension.      Palpations: Abdomen is soft. There is no mass.      Tenderness: There is no abdominal tenderness.   Genitourinary:     General: Normal vulva.      Exam position: Lithotomy position.       Labia:         Right: No rash.         Left: No rash.       Urethra: No urethral lesion.      Vagina: Prolapsed vaginal walls present. No lesions.      Cervix: No friability or lesion.      Uterus: Normal. With uterine prolapse. Not enlarged and not tender.       Adnexa: Right adnexa normal and left adnexa normal.        Right: No mass or tenderness.          Left: No mass or tenderness.        Comments: Cervix descends to vaginal opening with valsalva.   Mild cystocele is noted.   Musculoskeletal:         General: No deformity.      Cervical back: Neck supple.   Lymphadenopathy:      Cervical: No cervical adenopathy.   Skin:     General: Skin is warm and dry.      Findings: No rash.   Neurological:      General: No focal deficit present.      Mental Status: She is alert.   Psychiatric:         Mood and Affect: Mood normal.         Behavior: Behavior is cooperative.         Thought Content: Thought content normal.           Problem List Items Addressed This Visit       Well woman exam with routine gynecological exam - Primary    Overview     Anabelle endometrial ablation was performed on 4/21/2023. Surgical pathology returned benign.  12/20/2021 pap and HPV returned negative.          Current Assessment & Plan     Pap is not yet indicated.   Mammogram is ordered.  Regular exercise and attaining/maintaining a healthy weight is encouraged.   Adequate calcium intake with diet or supplements is encouraged.    We will notify of any abnormal results.          Urinary, incontinence, stress female    Overview     She notes pelvic prolapse and urinary frequency and incontinence with cough.   Pelvic floor PT is ordered. Urine culture is sent.   She will consider urogynecology referral if not improving.          Cystocele with uterine prolapse    Overview     Pressure and cervix can descend to introitus.   Pelvic floor PT referral is placed.          Other Visit Diagnoses       Encounter for screening mammogram for malignant  neoplasm of breast        Relevant Orders    BI mammo bilateral screening tomosynthesis

## 2024-06-24 NOTE — PROGRESS NOTES
An interactive audio and video telecommunication system which permits real time communications between the patient (at the originating site) and provider (at the distant site) was utilized to provide this telehealth service.    Verbal consent was requested and obtained for a telehealth visit.    Chief Complaint:  1.  Nasal airway obstruction, deviated nasal septum, inferior turbinate hypertrophy  2.  Facial pressure, headaches, migraines  3.  Allergic rhinitis  4.  Left maxillary sinus lesion    History Of Present Illness:    Steffanie Santoro presents since last being seen 5/13/24.     Main Symptoms:  Patient does not have anterior nasal drainage.     Patient does not have  posterior nasal drainage.    Patient has nasal airway obstruction.    Patient does not have  facial pain.    Patient has  facial pressure.  Left > right.    Patient does not have decreased sense of smell.   Associated Symptoms:   Patient has  headaches.    Patient does not have throat clearing.    Patient does not have coughing.    Patient does not have dysphonia.   Patient does not have nasal bleeding.     Medications currently on for sinonasal symptoms:   Xhance  1 puff each side QHS / Azelastine 2 puffs each side at bedtime (stopped a few days ago)    Active Problems:  Patient Active Problem List   Diagnosis    Abnormal liver function test    Astigmatism, bilateral    Generalized anxiety disorder with panic attacks    Hyperlipidemia    Hypothyroidism, acquired    IBS (irritable bowel syndrome)    Myopia of both eyes    PMDD (premenstrual dysphoric disorder)    Chronic nonintractable headache    Blurry vision, left eye    Well woman exam with routine gynecological exam        Past Medical History:  She has a past medical history of Personal history of other mental and behavioral disorders.    Surgical History:  She has a past surgical history that includes Foot surgery (01/22/2015); Other surgical history (01/22/2015); Other surgical history  (01/22/2015); and Other surgical history (01/22/2015).     Family History:  No family history on file.    Social History:  She reports that she has never smoked. She has never used smokeless tobacco. No history on file for alcohol use and drug use.     Allergies:  Tetracyclines, Cat dander, Dog dander, House dust, and Animal dander    Current Meds:    Current Outpatient Medications:     azelastine (Astelin) 137 mcg (0.1 %) nasal spray, USE 2 SPRAYS IN EACH NOSTRIL TWICE DAILY AS NEEDED FOR NASAL DRAINAGE., Disp: 90 mL, Rfl: 3    busPIRone (Buspar) 10 mg tablet, 1 tablet (10 mg) 3 times a day., Disp: , Rfl:     fluocinolone (DermOtic) 0.01 % ear drops, Instill 2 to 3 drops to each ear canal twice daily x 3 days as needed for itching/flaking skin., Disp: 20 mL, Rfl: 1    fluticasone propionate (Xhance) 93 mcg/actuation aerosol breath activated, Spray 1 spray in each nostril twice daily., Disp: 16 mL, Rfl: 11    hydrOXYzine pamoate (Vistaril) 50 mg capsule, Take 1 capsule (50 mg) by mouth 1 time if needed., Disp: , Rfl:     ondansetron ODT (Zofran-ODT) 4 mg disintegrating tablet, Take 1 tablet (4 mg) by mouth every 8 hours if needed for nausea or vomiting. (Patient not taking: Reported on 5/29/2024), Disp: 30 tablet, Rfl: 0    tirzepatide, weight loss, (Zepbound) 2.5 mg/0.5 mL injection, Inject 2.5 mg under the skin 1 (one) time per week., Disp: 2 mL, Rfl: 0    Vitals:  Visit Vitals  Smoking Status Never      Provider Impressions:  1.  Nasal airway obstruction, deviated nasal septum, inferior turbinate hypertrophy; right > left benefit with stenting   2.  Facial pressure, headaches, migraines  3.  Allergic rhinitis  4.  Left maxillary sinus lesion    Discussion:  Steffanie Santoro and IMMANUEL discussed next steps.  Unfortunately, she did not derive significant benefit with Xhance or Azelastine.  Prior to her initial assessment with me she had used Flonase long-term.  We discussed additional options.  She was comfortable  utilizing a rinse based steroid so mometasone 1 mg was prescribed to be used once per day within rinses over the next 2 months.  If she derives benefit, she certainly could consider utilizing this twice daily.    We reviewed her nasal congestion symptoms in more detail today.  She gets some benefit with stenting of her valves particularly on the right.  If she wished to consider surgical options for her nasal airway I would recommend evaluation with one of my complex nasal airway partners to discuss whether or not a valve component would be appropriate for her.  We also reviewed an MRI brain that was uploaded from [facility] obtained December 15, 2023 demonstrating a lesion consistent with a mucous retention cyst or polyp within the left maxillary sinus.  This lesion was also present on a CT neck from August 19, 2013.  We reviewed these 2 studies together today.  She could consider targeted left maxillary sinus surgery during the anesthetic for the nasal airway surgery if she ultimately moves forward with this.  Generally, lesions such as this do not cause significant sinonasal symptoms but she localizes to the left maxillary sinus in regard to pressure so I think addressing this region would be reasonable for her.  My partners can do this with airway surgery.    We also discussed her previous allergic assessment and that she had a number of positives.  She could consider reevaluation with allergy and consideration of immunotherapy if she is a candidate.    At the conclusion of today's visit she was comfortable moving forward with mometasone rinses and I would like to see her virtually in about 2 months.  All questions were answered.    Between face-to-face contact, review of the medical record, and documentation I spent 32 minutes on this evaluation during the day of service.    Signature:  Chilo Reilly MD

## 2024-06-25 DIAGNOSIS — J32.8 OTHER CHRONIC SINUSITIS: ICD-10-CM

## 2024-06-25 DIAGNOSIS — R09.81 NASAL CONGESTION: ICD-10-CM

## 2024-06-25 RX ORDER — MOMETASONE FUROATE 100 %
POWDER (GRAM) MISCELLANEOUS
Qty: 60 G | Refills: 0 | Status: SHIPPED | OUTPATIENT
Start: 2024-06-25

## 2024-06-27 ENCOUNTER — APPOINTMENT (OUTPATIENT)
Dept: OBSTETRICS AND GYNECOLOGY | Facility: CLINIC | Age: 49
End: 2024-06-27
Payer: COMMERCIAL

## 2024-06-27 VITALS
DIASTOLIC BLOOD PRESSURE: 80 MMHG | BODY MASS INDEX: 32.39 KG/M2 | WEIGHT: 176 LBS | SYSTOLIC BLOOD PRESSURE: 118 MMHG | HEIGHT: 62 IN

## 2024-06-27 DIAGNOSIS — Z12.31 ENCOUNTER FOR SCREENING MAMMOGRAM FOR MALIGNANT NEOPLASM OF BREAST: ICD-10-CM

## 2024-06-27 DIAGNOSIS — N39.3 URINARY, INCONTINENCE, STRESS FEMALE: ICD-10-CM

## 2024-06-27 DIAGNOSIS — N81.4 CYSTOCELE WITH UTERINE PROLAPSE: ICD-10-CM

## 2024-06-27 DIAGNOSIS — Z01.419 WELL WOMAN EXAM WITH ROUTINE GYNECOLOGICAL EXAM: Primary | ICD-10-CM

## 2024-06-27 PROCEDURE — 87086 URINE CULTURE/COLONY COUNT: CPT

## 2024-06-27 PROCEDURE — 99396 PREV VISIT EST AGE 40-64: CPT | Performed by: OBSTETRICS & GYNECOLOGY

## 2024-06-27 PROCEDURE — 3008F BODY MASS INDEX DOCD: CPT | Performed by: OBSTETRICS & GYNECOLOGY

## 2024-06-27 PROCEDURE — 1036F TOBACCO NON-USER: CPT | Performed by: OBSTETRICS & GYNECOLOGY

## 2024-06-27 ASSESSMENT — ENCOUNTER SYMPTOMS
AGITATION: 0
ACTIVITY CHANGE: 0
COUGH: 0
HEMATURIA: 0
DIARRHEA: 0
CONSTIPATION: 0
JOINT SWELLING: 0
APNEA: 0
DIZZINESS: 0

## 2024-06-29 LAB — BACTERIA UR CULT: NORMAL

## 2024-09-30 DIAGNOSIS — R11.0 NAUSEA: ICD-10-CM

## 2024-09-30 RX ORDER — ONDANSETRON 4 MG/1
4 TABLET, ORALLY DISINTEGRATING ORAL EVERY 8 HOURS PRN
Qty: 10 TABLET | Refills: 0 | Status: SHIPPED | OUTPATIENT
Start: 2024-09-30 | End: 2024-10-10

## 2025-02-24 DIAGNOSIS — N39.3 URINARY, INCONTINENCE, STRESS FEMALE: Primary | ICD-10-CM

## 2025-02-24 DIAGNOSIS — N81.4 CYSTOCELE WITH UTERINE PROLAPSE: ICD-10-CM

## 2025-04-01 ENCOUNTER — OFFICE VISIT (OUTPATIENT)
Facility: HOSPITAL | Age: 50
End: 2025-04-01
Payer: COMMERCIAL

## 2025-04-01 ENCOUNTER — TELEPHONE (OUTPATIENT)
Facility: HOSPITAL | Age: 50
End: 2025-04-01

## 2025-04-01 VITALS — SYSTOLIC BLOOD PRESSURE: 113 MMHG | DIASTOLIC BLOOD PRESSURE: 77 MMHG

## 2025-04-01 DIAGNOSIS — N39.3 SUI (STRESS URINARY INCONTINENCE, FEMALE): ICD-10-CM

## 2025-04-01 DIAGNOSIS — N32.81 OAB (OVERACTIVE BLADDER): ICD-10-CM

## 2025-04-01 DIAGNOSIS — M62.89 PELVIC FLOOR DYSFUNCTION: ICD-10-CM

## 2025-04-01 DIAGNOSIS — N81.9 FEMALE GENITAL PROLAPSE, UNSPECIFIED TYPE: Primary | ICD-10-CM

## 2025-04-01 DIAGNOSIS — R10.2 PELVIC PAIN IN FEMALE: ICD-10-CM

## 2025-04-01 PROCEDURE — 99214 OFFICE O/P EST MOD 30 MIN: CPT | Performed by: STUDENT IN AN ORGANIZED HEALTH CARE EDUCATION/TRAINING PROGRAM

## 2025-04-01 ASSESSMENT — COLUMBIA-SUICIDE SEVERITY RATING SCALE - C-SSRS
2. HAVE YOU ACTUALLY HAD ANY THOUGHTS OF KILLING YOURSELF?: NO
6. HAVE YOU EVER DONE ANYTHING, STARTED TO DO ANYTHING, OR PREPARED TO DO ANYTHING TO END YOUR LIFE?: NO
1. IN THE PAST MONTH, HAVE YOU WISHED YOU WERE DEAD OR WISHED YOU COULD GO TO SLEEP AND NOT WAKE UP?: NO

## 2025-04-01 NOTE — LETTER
April 2, 2025     Temi Henderson MD  [address]  [address] [place] [id number]    Patient: Steffanie Santoro   YOB: 1975   Date of Visit: 4/1/2025       Dear Dr. Temi Henderson MD:    Thank you for referring Steffanie Santoro to me for evaluation. Below are my notes for this consultation.  If you have questions, please do not hesitate to call me. I look forward to following your patient along with you.       Sincerely,     Javier Goncalves MD      CC: Flavia Galvin, DO  ______________________________________________________________________________________    HISTORY OF PRESENT ILLNESS:  Steffanie Santoro is a 49 y.o. female who presents today as a new patient. They were referred by Dr. Henderson for uterine prolapse and urinary incontinence. Also c/o JOVANI that is severely impairing and UUI and urinary urgency. She is bothered by her prolapse. It is extremely uncomfortable  She does have some urinary incontinence and increased frequency as well. She does wake up a few times at night to void as well. She has occasional stress incontinence also. Also has EDS and LBP/          Past Medical History  She has a past medical history of Personal history of other mental and behavioral disorders.    Surgical History  She has a past surgical history that includes Foot surgery (01/22/2015); Other surgical history (01/22/2015); Other surgical history (01/22/2015); and Other surgical history (01/22/2015).     Social History  She reports that she has never smoked. She has never used smokeless tobacco.  Drug: Marijuana. She reports that she does not drink alcohol.    Family History  No family history on file.     Allergies  Tetracyclines, Cat dander, Dog dander, House dust, and Animal dander      A comprehensive 10+ review of systems was negative except for: see hpi                          PHYSICAL EXAMINATION:  BP Readings from Last 3 Encounters:   04/01/25 113/77   06/27/24 118/80   12/05/23 140/80      Wt Readings  "from Last 3 Encounters:   06/27/24 79.8 kg (176 lb)   05/29/24 82.1 kg (181 lb)   05/13/24 85 kg (187 lb 4.8 oz)      BMI: Estimated body mass index is 32.19 kg/m² as calculated from the following:    Height as of 6/27/24: 1.575 m (5' 2\").    Weight as of 6/27/24: 79.8 kg (176 lb).  BSA: Estimated body surface area is 1.87 meters squared as calculated from the following:    Height as of 6/27/24: 1.575 m (5' 2\").    Weight as of 6/27/24: 79.8 kg (176 lb).  HEENT: Normocephalic, atraumatic, PER EOMI, nonicteric, trachea normal, thyroid normal, oropharynx normal.  CARDIAC: regular rate & rhythm, S1 & S2 normal.  No heaves, thrills, gallops or murmurs.  LUNGS: Clear to auscultation, no spinal or CV tenderness.  EXTREMITIES: No evidence of cyanosis, clubbing or edema.        Pelvic:  Chaperone for pelvic exam:   Genitourinary:  normal external genitalia, Bartholin's glands, [place]'s glands negative,   Urethra normal meatus, non-tender, no periurethral mass  Vaginal mucosa  normal  Cervix  normal  Uterus normal size, nontender  Adnexae  negative nontender, no masses  Atrophy negative    CST positive  Pelvic floor muscle contraction  4/5    POP-Q (in supine position):       Aa 1     Ba 1     C -2              gh 3     pb 3     tvl 8              Ap -1     Bp -1     D -2  Rectal: no hemorrhoids, fissures or masses.    PVR (by ultrasound): 0         Assessment:  49 y.o. female presents as a new patient with prolapse, OAB      Stage 2 UVP     I discussed treatment options including pessary and surgery, with regard to surgery discussed hysterectomy vs. Hysteropexy: major benefit of hysteropexy is shorter OR time and less EBL and outcomes equivalent to hysterectomy + repair at 3 years, but no data beyond that time point. Also discussed SCP vs native tissue repair; for SCP the failure rate is 5-10%, but associated with mesh complications including erosion <1% and SBO <0.5% vs native tissue repair which is associated with 20-30% " failure rate, but no long term risk of complications and only~15% requiring additional treatment.     Fitted with #5 ring with support  Plan on lap sravani, scp, sling, possible APR     ARIELLE    -discussed mechanism of UUI and JOVANI, and treatment options for both including PFT, pessary, sling for JOVANI and PFT, pharmacotherapy and third-line therapy for OAB      We discussed the sling procedure in depth with her there is a long-term success rate of 70-80% complete continence and up to 90% significant improvement up to 10 years after surgery, though the sling is meant to last a lifetime, there is a <5% risk of subsequent surgery, either revision or excision within 9 years. The major complications include bladder perforation with sling placement <1%, retention requiring sling lysis 1-3%, transient retention requiring 2-3 days of catheter drainage 33%, and mesh erosion 1-3%.     Bulkamid is associated with slightly less success rate of a sling about 60 to 70% of women having >90% improvement. However, there seems to be similar long-term success compared to sling with fewer side-effects. Main AE is urinary retention which resolves within 24 hours of using a 10-12 Georgian catheter.  I discussed that if she still has some leakage after her procedure, she could perform another injection within 4 weeks and this procedure being performed in the office.       we discussed botox vs sacral neuromodulation: both have similar efficacy 80% patients reports >50% improvement, botox associated with 5% risk of incomplete emptying, increase in UTI and will require re-injection in 6-9 months; and as early as 3 months. SNM is a staged procedure, 2 weeks apart, consisting first of lead implantation then internalization of IPG if there is improvement. Interstim is associated with lead migration, explantation, infection and bleeding, though risks are all <5%. We also discussed PTNS which is associated with success rates comparable to medical therapy  but without side-effects without significant major morbidity.       Informational handouts provided       Follow up in 6 weeks        All questions and concerns were answered and addressed.  The patient expressed understanding and agrees with the plan.     Javier Goncalves MD    Scribe Attestation  By signing my name below, I, Emma Ruff, Scribe   attest that this documentation has been prepared under the direction and in the presence of Javier Goncalves MD.

## 2025-04-01 NOTE — PROGRESS NOTES
"HISTORY OF PRESENT ILLNESS:  Steffanie Santoro is a 49 y.o. female who presents today as a new patient. They were referred by Dr. Henderson for uterine prolapse and urinary incontinence. Also c/o JOVANI that is severely impairing and UUI and urinary urgency. She is bothered by her prolapse. It is extremely uncomfortable  She does have some urinary incontinence and increased frequency as well. She does wake up a few times at night to void as well. She has occasional stress incontinence also. Also has EDS and LBP/          Past Medical History  She has a past medical history of Personal history of other mental and behavioral disorders.    Surgical History  She has a past surgical history that includes Foot surgery (01/22/2015); Other surgical history (01/22/2015); Other surgical history (01/22/2015); and Other surgical history (01/22/2015).     Social History  She reports that she has never smoked. She has never used smokeless tobacco.  Drug: Marijuana. She reports that she does not drink alcohol.    Family History  No family history on file.     Allergies  Tetracyclines, Cat dander, Dog dander, House dust, and Animal dander      A comprehensive 10+ review of systems was negative except for: see hpi                          PHYSICAL EXAMINATION:  BP Readings from Last 3 Encounters:   04/01/25 113/77   06/27/24 118/80   12/05/23 140/80      Wt Readings from Last 3 Encounters:   06/27/24 79.8 kg (176 lb)   05/29/24 82.1 kg (181 lb)   05/13/24 85 kg (187 lb 4.8 oz)      BMI: Estimated body mass index is 32.19 kg/m² as calculated from the following:    Height as of 6/27/24: 1.575 m (5' 2\").    Weight as of 6/27/24: 79.8 kg (176 lb).  BSA: Estimated body surface area is 1.87 meters squared as calculated from the following:    Height as of 6/27/24: 1.575 m (5' 2\").    Weight as of 6/27/24: 79.8 kg (176 lb).  HEENT: Normocephalic, atraumatic, PER EOMI, nonicteric, trachea normal, thyroid normal, oropharynx normal.  CARDIAC: regular " rate & rhythm, S1 & S2 normal.  No heaves, thrills, gallops or murmurs.  LUNGS: Clear to auscultation, no spinal or CV tenderness.  EXTREMITIES: No evidence of cyanosis, clubbing or edema.        Pelvic:  Chaperone for pelvic exam:   Genitourinary:  normal external genitalia, Bartholin's glands, [place]'s glands negative,   Urethra normal meatus, non-tender, no periurethral mass  Vaginal mucosa  normal  Cervix  normal  Uterus normal size, nontender  Adnexae  negative nontender, no masses  Atrophy negative    CST positive  Pelvic floor muscle contraction  4/5    POP-Q (in supine position):       Aa 1     Ba 1     C -2              gh 3     pb 3     tvl 8              Ap -1     Bp -1     D -2  Rectal: no hemorrhoids, fissures or masses.    PVR (by ultrasound): 0         Assessment:  49 y.o. female presents as a new patient with prolapse, OAB      Stage 2 UVP     I discussed treatment options including pessary and surgery, with regard to surgery discussed hysterectomy vs. Hysteropexy: major benefit of hysteropexy is shorter OR time and less EBL and outcomes equivalent to hysterectomy + repair at 3 years, but no data beyond that time point. Also discussed SCP vs native tissue repair; for SCP the failure rate is 5-10%, but associated with mesh complications including erosion <1% and SBO <0.5% vs native tissue repair which is associated with 20-30% failure rate, but no long term risk of complications and only~15% requiring additional treatment.     Fitted with #5 ring with support  Plan on lap sravani, scp, sling, possible APR     ARIELLE    -discussed mechanism of UUI and JOVANI, and treatment options for both including PFT, pessary, sling for JOVANI and PFT, pharmacotherapy and third-line therapy for OAB      We discussed the sling procedure in depth with her there is a long-term success rate of 70-80% complete continence and up to 90% significant improvement up to 10 years after surgery, though the sling is meant to last a lifetime,  there is a <5% risk of subsequent surgery, either revision or excision within 9 years. The major complications include bladder perforation with sling placement <1%, retention requiring sling lysis 1-3%, transient retention requiring 2-3 days of catheter drainage 33%, and mesh erosion 1-3%.     Bulkamid is associated with slightly less success rate of a sling about 60 to 70% of women having >90% improvement. However, there seems to be similar long-term success compared to sling with fewer side-effects. Main AE is urinary retention which resolves within 24 hours of using a 10-12 Japanese catheter.  I discussed that if she still has some leakage after her procedure, she could perform another injection within 4 weeks and this procedure being performed in the office.       we discussed botox vs sacral neuromodulation: both have similar efficacy 80% patients reports >50% improvement, botox associated with 5% risk of incomplete emptying, increase in UTI and will require re-injection in 6-9 months; and as early as 3 months. SNM is a staged procedure, 2 weeks apart, consisting first of lead implantation then internalization of IPG if there is improvement. Interstim is associated with lead migration, explantation, infection and bleeding, though risks are all <5%. We also discussed PTNS which is associated with success rates comparable to medical therapy but without side-effects without significant major morbidity.       Informational handouts provided       Follow up in 6 weeks        All questions and concerns were answered and addressed.  The patient expressed understanding and agrees with the plan.     Javier Goncalves MD    Scribe Attestation  By signing my name below, IEmma Scribe   attest that this documentation has been prepared under the direction and in the presence of Javier Goncalves MD.

## 2025-04-02 PROBLEM — N39.3 SUI (STRESS URINARY INCONTINENCE, FEMALE): Status: ACTIVE | Noted: 2025-04-01

## 2025-04-02 PROBLEM — N81.9 FEMALE GENITAL PROLAPSE: Status: ACTIVE | Noted: 2025-04-01

## 2025-04-02 RX ORDER — PHENAZOPYRIDINE HYDROCHLORIDE 100 MG/1
200 TABLET, FILM COATED ORAL ONCE
OUTPATIENT
Start: 2025-04-02 | End: 2025-04-02

## 2025-04-02 RX ORDER — CEFAZOLIN SODIUM 2 G/50ML
2 SOLUTION INTRAVENOUS ONCE
OUTPATIENT
Start: 2025-04-02 | End: 2025-04-02

## 2025-04-02 RX ORDER — ACETAMINOPHEN 325 MG/1
975 TABLET ORAL ONCE
OUTPATIENT
Start: 2025-04-02 | End: 2025-04-02

## 2025-04-02 RX ORDER — CELECOXIB 200 MG/1
200 CAPSULE ORAL ONCE
OUTPATIENT
Start: 2025-04-02 | End: 2025-04-02

## 2025-05-12 NOTE — PROGRESS NOTES
"HISTORY OF PRESENT ILLNESS:  Steffanie Santoro is a 50 y.o. female who presents today for a follow up visit. She had to take her pessary out because she was having some uterine cramping and discomfort. She tried to put it back in, but was unable and has extreme pain with it.  Presently feels ok, but has been very bothered by her symptoms          Past Medical History  She has a past medical history of Personal history of other mental and behavioral disorders.    Surgical History  She has a past surgical history that includes Foot surgery (01/22/2015); Other surgical history (01/22/2015); Other surgical history (01/22/2015); and Other surgical history (01/22/2015).     Social History  She reports that she has never smoked. She has never used smokeless tobacco.  Drug: Marijuana. She reports that she does not drink alcohol.    Family History  Family History[1]     Allergies  Tetracyclines, Cat dander, Dog dander, House dust, and Animal dander      A comprehensive 10+ review of systems was negative except for: see hpi                          PHYSICAL EXAMINATION:  BP Readings from Last 3 Encounters:   04/01/25 113/77   06/27/24 118/80   12/05/23 140/80      Wt Readings from Last 3 Encounters:   06/27/24 79.8 kg (176 lb)   05/29/24 82.1 kg (181 lb)   05/13/24 85 kg (187 lb 4.8 oz)      BMI: Estimated body mass index is 32.19 kg/m² as calculated from the following:    Height as of 6/27/24: 1.575 m (5' 2\").    Weight as of 6/27/24: 79.8 kg (176 lb).  BSA: Estimated body surface area is 1.87 meters squared as calculated from the following:    Height as of 6/27/24: 1.575 m (5' 2\").    Weight as of 6/27/24: 79.8 kg (176 lb).  HEENT: Normocephalic, atraumatic, PER EOMI, nonicteric, trachea normal, thyroid normal, oropharynx normal.  CARDIAC: regular rate & rhythm, S1 & S2 normal.  No heaves, thrills, gallops or murmurs.  LUNGS: Clear to auscultation, no spinal or CV tenderness.  EXTREMITIES: No evidence of cyanosis, clubbing or " edema.               Assessment:  50 y.o. female presents with prolapse, OAB      Stage 2 UVP:    Fitted with #5 ring with support, caused cramping, removed   Plan on lap sravani, scp, sling, possible APR         ARIELLE  -discussed mechanism of UUI and JOVANI, and treatment options for both including PFT, pessary, sling for JOVANI and PFT, pharmacotherapy and third-line therapy for OAB    + CST, will need a sling       Follow up in 3 months        All questions and concerns were answered and addressed.  The patient expressed understanding and agrees with the plan.     Javier Goncalves MD    Scribe Attestation  By signing my name below, I, Emma Ruff, Scribe   attest that this documentation has been prepared under the direction and in the presence of Javier Goncalves MD.         [1] No family history on file.

## 2025-05-13 ENCOUNTER — APPOINTMENT (OUTPATIENT)
Facility: HOSPITAL | Age: 50
End: 2025-05-13
Payer: COMMERCIAL

## 2025-05-13 DIAGNOSIS — N81.9 FEMALE GENITAL PROLAPSE, UNSPECIFIED TYPE: Primary | ICD-10-CM

## 2025-05-13 DIAGNOSIS — N32.81 OAB (OVERACTIVE BLADDER): ICD-10-CM

## 2025-05-13 DIAGNOSIS — N39.3 SUI (STRESS URINARY INCONTINENCE, FEMALE): ICD-10-CM

## 2025-05-13 PROCEDURE — 99214 OFFICE O/P EST MOD 30 MIN: CPT | Performed by: STUDENT IN AN ORGANIZED HEALTH CARE EDUCATION/TRAINING PROGRAM

## 2025-05-13 ASSESSMENT — COLUMBIA-SUICIDE SEVERITY RATING SCALE - C-SSRS
2. HAVE YOU ACTUALLY HAD ANY THOUGHTS OF KILLING YOURSELF?: NO
1. IN THE PAST MONTH, HAVE YOU WISHED YOU WERE DEAD OR WISHED YOU COULD GO TO SLEEP AND NOT WAKE UP?: NO
6. HAVE YOU EVER DONE ANYTHING, STARTED TO DO ANYTHING, OR PREPARED TO DO ANYTHING TO END YOUR LIFE?: NO

## 2025-05-14 DIAGNOSIS — M62.89 PELVIC FLOOR DYSFUNCTION: ICD-10-CM

## 2025-05-14 DIAGNOSIS — R10.2 PELVIC PAIN IN FEMALE: Primary | ICD-10-CM

## 2025-05-14 RX ORDER — TIZANIDINE 4 MG/1
4 TABLET ORAL EVERY 6 HOURS PRN
Qty: 30 TABLET | Refills: 1 | Status: SHIPPED | OUTPATIENT
Start: 2025-05-14 | End: 2025-05-24

## 2025-05-20 DIAGNOSIS — N39.3 SUI (STRESS URINARY INCONTINENCE, FEMALE): Primary | ICD-10-CM

## 2025-05-20 DIAGNOSIS — R10.2 PELVIC PAIN: ICD-10-CM

## 2025-05-20 RX ORDER — CYCLOBENZAPRINE HCL 5 MG
5 TABLET ORAL 3 TIMES DAILY
Qty: 30 TABLET | Refills: 0 | Status: SHIPPED | OUTPATIENT
Start: 2025-05-20 | End: 2025-05-30

## 2025-08-05 DIAGNOSIS — Z12.31 ENCOUNTER FOR SCREENING MAMMOGRAM FOR BREAST CANCER: ICD-10-CM

## 2025-08-13 DIAGNOSIS — R52 PAIN: Primary | ICD-10-CM

## 2025-08-13 RX ORDER — CELECOXIB 200 MG/1
200 CAPSULE ORAL 2 TIMES DAILY
Qty: 60 CAPSULE | Refills: 2 | Status: SHIPPED | OUTPATIENT
Start: 2025-08-13 | End: 2025-11-11

## 2025-09-05 ENCOUNTER — APPOINTMENT (OUTPATIENT)
Dept: UROLOGY | Facility: CLINIC | Age: 50
End: 2025-09-05
Payer: COMMERCIAL

## 2025-11-11 ENCOUNTER — APPOINTMENT (OUTPATIENT)
Dept: UROLOGY | Facility: CLINIC | Age: 50
End: 2025-11-11
Payer: COMMERCIAL